# Patient Record
Sex: MALE | Race: OTHER | Employment: OTHER | URBAN - METROPOLITAN AREA
[De-identification: names, ages, dates, MRNs, and addresses within clinical notes are randomized per-mention and may not be internally consistent; named-entity substitution may affect disease eponyms.]

---

## 2021-04-16 ENCOUNTER — APPOINTMENT (OUTPATIENT)
Dept: GENERAL RADIOLOGY | Age: 26
End: 2021-04-16
Attending: EMERGENCY MEDICINE
Payer: OTHER GOVERNMENT

## 2021-04-16 ENCOUNTER — HOSPITAL ENCOUNTER (EMERGENCY)
Age: 26
Discharge: HOME | End: 2021-04-16
Attending: EMERGENCY MEDICINE
Payer: OTHER GOVERNMENT

## 2021-04-16 VITALS
HEIGHT: 70 IN | TEMPERATURE: 97.9 F | DIASTOLIC BLOOD PRESSURE: 92 MMHG | OXYGEN SATURATION: 96 % | HEART RATE: 69 BPM | SYSTOLIC BLOOD PRESSURE: 134 MMHG | RESPIRATION RATE: 16 BRPM | WEIGHT: 205 LBS | BODY MASS INDEX: 29.35 KG/M2

## 2021-04-16 DIAGNOSIS — J39.2 THROAT IRRITATION: Primary | ICD-10-CM

## 2021-04-16 LAB — S PYO AG THROAT QL: NEGATIVE

## 2021-04-16 PROCEDURE — 70360 X-RAY EXAM OF NECK: CPT

## 2021-04-16 PROCEDURE — 96375 TX/PRO/DX INJ NEW DRUG ADDON: CPT

## 2021-04-16 PROCEDURE — 96374 THER/PROPH/DIAG INJ IV PUSH: CPT

## 2021-04-16 PROCEDURE — 87880 STREP A ASSAY W/OPTIC: CPT

## 2021-04-16 PROCEDURE — 87147 CULTURE TYPE IMMUNOLOGIC: CPT

## 2021-04-16 PROCEDURE — 99283 EMERGENCY DEPT VISIT LOW MDM: CPT

## 2021-04-16 PROCEDURE — 74011000250 HC RX REV CODE- 250: Performed by: EMERGENCY MEDICINE

## 2021-04-16 PROCEDURE — 87070 CULTURE OTHR SPECIMN AEROBIC: CPT

## 2021-04-16 PROCEDURE — 74011250637 HC RX REV CODE- 250/637: Performed by: EMERGENCY MEDICINE

## 2021-04-16 PROCEDURE — 74011250636 HC RX REV CODE- 250/636: Performed by: EMERGENCY MEDICINE

## 2021-04-16 RX ORDER — DEXAMETHASONE SODIUM PHOSPHATE 4 MG/ML
6 INJECTION, SOLUTION INTRA-ARTICULAR; INTRALESIONAL; INTRAMUSCULAR; INTRAVENOUS; SOFT TISSUE ONCE
Status: COMPLETED | OUTPATIENT
Start: 2021-04-16 | End: 2021-04-16

## 2021-04-16 RX ORDER — ONDANSETRON 2 MG/ML
4 INJECTION INTRAMUSCULAR; INTRAVENOUS
Status: COMPLETED | OUTPATIENT
Start: 2021-04-16 | End: 2021-04-16

## 2021-04-16 RX ADMIN — LIDOCAINE HYDROCHLORIDE 40 ML: 20 SOLUTION ORAL; TOPICAL at 15:11

## 2021-04-16 RX ADMIN — GLUCAGON HYDROCHLORIDE 1 MG: 1 INJECTION, POWDER, FOR SOLUTION INTRAMUSCULAR; INTRAVENOUS; SUBCUTANEOUS at 15:53

## 2021-04-16 RX ADMIN — ONDANSETRON 4 MG: 2 INJECTION INTRAMUSCULAR; INTRAVENOUS at 15:53

## 2021-04-16 RX ADMIN — DEXAMETHASONE SODIUM PHOSPHATE 6 MG: 4 INJECTION, SOLUTION INTRAMUSCULAR; INTRAVENOUS at 15:09

## 2021-04-16 NOTE — DISCHARGE INSTRUCTIONS
Take over-the-counter pain medication as needed for pain. Hydrate well. Avoid sharp foods such as chips, citrus, or other irritants. Follow-up outpatient with GI. Return to the emergency department with any foreign body sensation, or if you experience any pain, discomfort, sore throat, difficulty breathing or swallowing, or any other concerns.

## 2021-04-16 NOTE — ED PROVIDER NOTES
EMERGENCY DEPARTMENT HISTORY AND PHYSICAL EXAM    Date: 4/16/2021  Patient Name: Constanza Be    History of Presenting Illness     Chief Complaint   Patient presents with    Foreign Body Swallowed         History Provided By: Patient    [address]  Constanza Be is a 32 y.o. male with no significant PMHX who presents to the emergency department C/O painful swallowing ongoing since this morning. Patient reports that 1 month ago he swallowed a plastic piece from clothing tag. Denies any difficulty or painful swallowing from that point forward until he woke up this morning and felt like he had something stuck when he tried to swallow. He reports that he is able to drink water without difficulty and is swallowing his secretions with no issues. Denies any difficulty breathing or sore throat however reports that he feels like there is something stuck towards the back of his throat. No fever, chills, congestion or other symptoms. PCP: Wilian, MD Ale        Past History     Past Medical History:  No past medical history on file. Past Surgical History:  No past surgical history on file. Family History:  No family history on file. Social History:  Social History     Tobacco Use    Smoking status: Not on file   Substance Use Topics    Alcohol use: Not on file    Drug use: Not on file       Allergies:  No Known Allergies      Review of Systems   Review of Systems   Constitutional: Negative for fever. HENT: Positive for trouble swallowing. Negative for drooling and sore throat. Respiratory: Negative for cough and shortness of breath. Cardiovascular: Negative for chest pain. Gastrointestinal: Negative for abdominal pain, nausea and vomiting. All other systems reviewed and are negative.       Physical Exam     Vitals:    04/16/21 1302   BP: (!) 134/92   Pulse: 69   Resp: 16   Temp: 97.9 °F (36.6 °C)   SpO2: 96%   Weight: 93 kg (205 lb)   Height: 5' 9.5\" (1.765 m)     Physical Exam    Nursing notes and vital signs reviewed    Constitutional: Non toxic appearing, no acute distress  Head: Normocephalic, Atraumatic  Eyes: EOMI  Neck: Supple  ENT: Airway patent, no posterior oropharyngeal erythema, edema or exudates, patient tolerating secretions with no difficulties, uvula midline  Cardiovascular: Regular rate and rhythm, no murmurs, rubs, or gallops  Chest: Normal work of breathing and chest excursion bilaterally  Lungs: Clear to ausculation bilaterally  Abdomen: Soft, non tender, non distended, normoactive bowel sounds  Back: No evidence of trauma or deformity  Extremities: No evidence of trauma or deformity, no LE edema  Skin: Warm and dry, normal cap refill  Neuro: Alert and appropriate, normal speech, strength and sensation full and symmetric bilaterally, normal gait, normal coordination  Psychiatric: Normal mood and affect      Diagnostic Study Results     Labs -     No results found for this or any previous visit (from the past 12 hour(s)). Radiologic Studies -   XR NECK SOFT TISSUE   Final Result   -------------      1. No significant abnormality. CT Results  (Last 48 hours)    None        CXR Results  (Last 48 hours)    None          Medications given in the ED-  Medications   mylanta/viscous lidocaine (GI COCKTAIL) (40 mL Oral Given 4/16/21 1511)   dexamethasone (DECADRON) 4 mg/mL injection 6 mg (6 mg IntraVENous Given 4/16/21 1509)   ondansetron (ZOFRAN) injection 4 mg (4 mg IntraVENous Given 4/16/21 1553)   glucagon (GLUCAGEN) injection 1 mg (1 mg IntraVENous Given 4/16/21 1553)         Medical Decision Making   I am the first provider for this patient. I reviewed the vital signs, available nursing notes, past medical history, past surgical history, family history and social history. Vital Signs-Reviewed the patient's vital signs.     Pulse Oximetry Analysis - 96% on room air, not hypoxic     Records Reviewed: Nursing Notes    Provider Notes (Medical Decision Making): Tarsha Sherman Carolina Melvin is a 32 y.o. male with no significant past medical history presenting with foreign body sensation and painful swallowing ongoing since this morning. On exam patient in no acute distress with airway patent, no posterior oropharyngeal erythema edema or exudates, patient tolerating secretions with no difficulties. Will obtain rapid strep and throat culture, will give Decadron and viscous lidocaine/Maalox. Will reassess. Procedures:  Procedures    ED Course:   8289 rapid strep negative. Patient seen and reassessed, resting comfortably. Patient states that he is able to drink water with no issues however reports that he still feels some discomfort with swallowing. States his symptoms are improved but still present. Therefore will give Zofran and glucagon as well as obtain x-ray of soft tissue neck and will reassess. Patient seen after Zofran and glucagon injection and reports resolution of his symptoms. Eating and drinking with no issues. x-ray soft tissue neck negative. Will discharge with follow-up. Patient offering no questions or complaints. Instructed to take over-the-counter pain medication as needed for pain, hydrate well, avoid sharp foods such as chips, citrus, or other irritants, follow-up outpatient with GI and return to the emergency department with any foreign body sensation, pain, discomfort, sore throat, difficulty breathing or swallowing, or any other concerns. Diagnosis and Disposition       DISCHARGE NOTE:    [employer]  results have been reviewed with him. He has been counseled regarding his diagnosis, treatment, and plan. He verbally conveys understanding and agreement of the signs, symptoms, diagnosis, treatment and prognosis and additionally agrees to follow up as discussed. He also agrees with the care-plan and conveys that all of his questions have been answered.   I have also provided discharge instructions for him that include: educational information regarding their diagnosis and treatment, and list of reasons why they would want to return to the ED prior to their follow-up appointment, should his condition change. He has been provided with education for proper emergency department utilization. CLINICAL IMPRESSION:    1. Throat irritation        PLAN:  1. D/C Home  2. There are no discharge medications for this patient. 3.   Follow-up Information     Follow up With Specialties Details Why Contact Info    Bibi Yeung MD Gastroenterology Schedule an appointment as soon as possible for a visit   [address] [place] [address] [address]      [facility] EMERGENCY DEPT Emergency Medicine  If symptoms worsen [address] [facility] [address]  [phone]        _______________________________      Please note that this dictation was completed with [employer], the computer voice recognition software. Quite often unanticipated grammatical, syntax, homophones, and other interpretive errors are inadvertently transcribed by the computer software. Please disregard these errors. Please excuse any errors that have escaped final proofreading.

## 2021-04-16 NOTE — LETTER
[facility] [place] 
[facility] EMERGENCY DEPT 
[address] [id number] [phone] Work/School Note Date: 4/16/2021 To Whom It May concern: 
 
Cole Jang was seen and treated today in the emergency room by the following provider(s): 
No providers found. Cole Jang is excused from work/school on 04/19/21 and 04/20/21. He is medically clear to return to work/school on 4/21/2021.   
 
 
Sincerely, 
 
 
 
 
ANJU Price

## 2021-04-16 NOTE — ED TRIAGE NOTES
Patient reports \"I am having a problem swallowing my saliva\"  Patient reports had swallowed a plastic piece from the tag from price tags x 1 MONTH AGO

## 2021-04-18 LAB
BACTERIA SPEC CULT: ABNORMAL
BACTERIA SPEC CULT: ABNORMAL
SERVICE CMNT-IMP: ABNORMAL

## 2021-04-18 NOTE — CALL BACK NOTE
11:19 AM 
04/18/2021 
 
+ throat culture. May need ABX. Called patient. No answer. Left message to call ED back to discuss results.   
 
Fan Frye PA-C

## 2021-04-19 RX ORDER — AMOXICILLIN 875 MG/1
875 TABLET, FILM COATED ORAL 2 TIMES DAILY
Qty: 20 TAB | Refills: 0 | Status: SHIPPED | OUTPATIENT
Start: 2021-04-19 | End: 2021-04-29

## 2021-04-19 NOTE — CALL BACK NOTE
Second attempt to contact patient. He answered and we discussed his +strep throat culture result. He states he is still having mild sore throat and not feeling well and also requests a work note for today and tomorrow. Prescription for amoxicillin sent to his [place] and work note left at ED registration desk for him to  today.